# Patient Record
Sex: MALE | Race: WHITE | URBAN - METROPOLITAN AREA
[De-identification: names, ages, dates, MRNs, and addresses within clinical notes are randomized per-mention and may not be internally consistent; named-entity substitution may affect disease eponyms.]

---

## 2017-09-11 ENCOUNTER — TELEPHONE (OUTPATIENT)
Dept: FAMILY MEDICINE CLINIC | Age: 62
End: 2017-09-11

## 2017-09-11 DIAGNOSIS — Z00.00 WELL ADULT EXAM: Primary | ICD-10-CM

## 2017-09-11 RX ORDER — SIMVASTATIN 20 MG
TABLET ORAL
Qty: 90 TABLET | Refills: 0 | Status: SHIPPED | OUTPATIENT
Start: 2017-09-11 | End: 2017-12-01 | Stop reason: SDUPTHER

## 2017-09-15 DIAGNOSIS — I10 ESSENTIAL HYPERTENSION, MALIGNANT: ICD-10-CM

## 2017-09-15 RX ORDER — LISINOPRIL 20 MG/1
TABLET ORAL
Qty: 90 TABLET | Refills: 0 | Status: SHIPPED | OUTPATIENT
Start: 2017-09-15 | End: 2017-12-18 | Stop reason: SDUPTHER

## 2017-09-17 ENCOUNTER — PATIENT MESSAGE (OUTPATIENT)
Dept: FAMILY MEDICINE CLINIC | Age: 62
End: 2017-09-17

## 2017-09-18 NOTE — TELEPHONE ENCOUNTER
From: Indira Worrell  To: Nicolás Banda MD  Sent: 9/17/2017 4:05 PM EDT  Subject: Visit Follow-Up Question    Hi I have an appointment with Dr. Shelly Wiley a [address] [facility] Assessment Visit on Monday, November 13 at 9 a.m. I need to get a script for a lab batter for general (PSA, Colon Screen, etc.). I had a colonoscopy last year with negative results, so I just need to know what tests to get, and I can get an appointment with a local [facility] here in [place], [place]. I will get a flu shot at [facility] separately. The last message said I needed several tests and vaccinations--I just need to know which ones are important for my visit with Dr. Shelly Wiley.     Thanks, Kannan Marin

## 2017-10-31 ENCOUNTER — TELEPHONE (OUTPATIENT)
Dept: FAMILY MEDICINE CLINIC | Age: 62
End: 2017-10-31

## 2017-10-31 NOTE — TELEPHONE ENCOUNTER
Jewel Allan with [facility] in [place] called in wanting to know what the order for TSH with [facility] was in [facility] too.      Please call her back at [phone]

## 2017-11-02 LAB
ALBUMIN SERPL-MCNC: 4.4 G/DL
ALP BLD-CCNC: 44 U/L
ALT SERPL-CCNC: 48 U/L
ANION GAP SERPL CALCULATED.3IONS-SCNC: 6.8 MMOL/L
ANTIBODY: 0.02
AST SERPL-CCNC: 37 U/L
BASOPHILS ABSOLUTE: 0 /ΜL
BASOPHILS RELATIVE PERCENT: 1 %
BILIRUB SERPL-MCNC: 1.1 MG/DL (ref 0.1–1.4)
BUN BLDV-MCNC: 14 MG/DL
CALCIUM SERPL-MCNC: 9.6 MG/DL
CHLORIDE BLD-SCNC: 104 MMOL/L
CHOLESTEROL, TOTAL: 188 MG/DL
CHOLESTEROL/HDL RATIO: NORMAL
CO2: 29 MMOL/L
CREAT SERPL-MCNC: 0.9 MG/DL
EOSINOPHILS ABSOLUTE: 0.4 /ΜL
EOSINOPHILS RELATIVE PERCENT: 7 %
GFR CALCULATED: 92
GLUCOSE BLD-MCNC: 98 MG/DL
HCT VFR BLD CALC: 46.1 % (ref 41–53)
HDLC SERPL-MCNC: 68 MG/DL (ref 35–70)
HEMOGLOBIN: 15.8 G/DL (ref 13.5–17.5)
LDL CHOLESTEROL CALCULATED: 108 MG/DL (ref 0–160)
LYMPHOCYTES ABSOLUTE: 1.8 /ΜL
LYMPHOCYTES RELATIVE PERCENT: 31 %
MCH RBC QN AUTO: 32.2 PG
MCHC RBC AUTO-ENTMCNC: 34.3 G/DL
MCV RBC AUTO: 94.1 FL
MONOCYTES ABSOLUTE: 0.5 /ΜL
MONOCYTES RELATIVE PERCENT: 9 %
NEUTROPHILS ABSOLUTE: 3 /ΜL
NEUTROPHILS RELATIVE PERCENT: 52 %
PLATELET # BLD: 167 K/ΜL
PMV BLD AUTO: 8 FL
POTASSIUM SERPL-SCNC: 4.1 MMOL/L
RBC # BLD: 4.9 10^6/ΜL
SODIUM BLD-SCNC: 139 MMOL/L
TOTAL PROTEIN: 7.9
TRIGL SERPL-MCNC: 62 MG/DL
TSH SERPL DL<=0.05 MIU/L-ACNC: 1.53 UIU/ML
VLDLC SERPL CALC-MCNC: 12 MG/DL
WBC # BLD: 5.7 10^3/ML

## 2017-11-13 ENCOUNTER — OFFICE VISIT (OUTPATIENT)
Dept: FAMILY MEDICINE CLINIC | Age: 62
End: 2017-11-13

## 2017-11-13 VITALS
HEIGHT: 72 IN | DIASTOLIC BLOOD PRESSURE: 80 MMHG | OXYGEN SATURATION: 98 % | SYSTOLIC BLOOD PRESSURE: 138 MMHG | RESPIRATION RATE: 14 BRPM | WEIGHT: 222 LBS | BODY MASS INDEX: 30.07 KG/M2 | HEART RATE: 62 BPM

## 2017-11-13 DIAGNOSIS — I10 ESSENTIAL HYPERTENSION: ICD-10-CM

## 2017-11-13 DIAGNOSIS — Z00.00 ANNUAL PHYSICAL EXAM: Primary | ICD-10-CM

## 2017-11-13 DIAGNOSIS — E78.2 MIXED HYPERLIPIDEMIA: ICD-10-CM

## 2017-11-13 DIAGNOSIS — R97.20 ELEVATED PSA: ICD-10-CM

## 2017-11-13 LAB
BILIRUBIN, POC: NORMAL
BLOOD URINE, POC: NORMAL
CLARITY, POC: CLEAR
COLOR, POC: YELLOW
GLUCOSE URINE, POC: NORMAL
KETONES, POC: NORMAL
LEUKOCYTE EST, POC: NORMAL
NITRITE, POC: NORMAL
PH, POC: 6.5
PROTEIN, POC: NORMAL
SPECIFIC GRAVITY, POC: 1.02
UROBILINOGEN, POC: 0.2

## 2017-11-13 PROCEDURE — 81002 URINALYSIS NONAUTO W/O SCOPE: CPT | Performed by: FAMILY MEDICINE

## 2017-11-13 PROCEDURE — 99214 OFFICE O/P EST MOD 30 MIN: CPT | Performed by: FAMILY MEDICINE

## 2017-11-13 RX ORDER — SILDENAFIL 100 MG/1
TABLET, FILM COATED ORAL
Qty: 6 TABLET | Refills: 4 | Status: SHIPPED | OUTPATIENT
Start: 2017-11-13 | End: 2019-08-09 | Stop reason: SDUPTHER

## 2017-11-13 ASSESSMENT — PATIENT HEALTH QUESTIONNAIRE - PHQ9
SUM OF ALL RESPONSES TO PHQ9 QUESTIONS 1 & 2: 0
1. LITTLE INTEREST OR PLEASURE IN DOING THINGS: 0
SUM OF ALL RESPONSES TO PHQ QUESTIONS 1-9: 0
2. FEELING DOWN, DEPRESSED OR HOPELESS: 0

## 2017-11-13 NOTE — PATIENT INSTRUCTIONS
your [email or web address] account. Enter J174 in the [facility] box to learn more about \"Well Visit, Men 48 to 72: Care Instructions. \"     If you do not have an account, please click on the \"Sign Up Now\" link. Current as of: July 19, 2016  Content Version: 11.3  © 9445-0229 [employer]. Care instructions adapted under license by [facility] ([facility]). If you have questions about a medical condition or this instruction, always ask your healthcare professional. [address] any warranty or liability for your use of this information.

## 2017-11-13 NOTE — PROGRESS NOTES
Date     11/01/2017    K 4.1 11/01/2017     11/01/2017    CO2 29 11/01/2017    BUN 14.0 11/01/2017    CREATININE 0.9 11/01/2017    GLUCOSE 98 11/01/2017    CALCIUM 9.6 11/01/2017    PROT 7.8 10/26/2015    LABALBU 4.4 11/01/2017    BILITOT 1.1 11/01/2017    ALKPHOS 44 11/01/2017    AST 37 11/01/2017    ALT 48 11/01/2017    LABGLOM 92 11/01/2017    GFRAA >60 10/26/2015    AGRATIO 1.7 10/26/2015    GLOB 2.9 10/26/2015     Lab Results   Component Value Date    WBC 5.7 11/01/2017    HGB 15.8 11/01/2017    HCT 46.1 11/01/2017    MCV 94.1 11/01/2017     11/01/2017     Lab Results   Component Value Date    CHOL 188 11/01/2017    CHOL 184 10/26/2015    CHOL 189 01/03/2014     Lab Results   Component Value Date    TRIG 62 11/01/2017    TRIG 99 10/26/2015    TRIG 84 01/03/2014     Lab Results   Component Value Date    HDL 68 11/01/2017    HDL 63 (H) 10/26/2015    HDL 59 01/03/2014     Lab Results   Component Value Date    LDLCALC 108 11/01/2017    LDLCALC 101 (H) 10/26/2015    LDLCALC 113 01/03/2014     Lab Results   Component Value Date    LABVLDL 20 10/26/2015    VLDL 12 11/01/2017     Lab Results   Component Value Date    CHOLHDLRATIO 3.2 01/03/2014     Lab Results   Component Value Date    PSA 2.9 01/03/2014     ASSESSMENT/PLAN:  1. Annual physical exam    - POCT Urinalysis no Micro    2. Mixed hyperlipidemia  Stable continue present medication    3. Essential hypertension  Stable continue present medication  #4 ED  - sildenafil (VIAGRA) 100 MG tablet; 1/2 to 1 tab po qd prn erection  Dispense: 6 tablet;  Refill: 4    #5 elevated PSA  Repeat PSA in 3 months  #6 health maintenance just a colonoscopy  Will get flu shot in 2 weeks while in [place]  Consider flu shot    Spent with patient greater than 50% of time reviewing his medications and labs

## 2017-12-01 RX ORDER — SIMVASTATIN 20 MG
TABLET ORAL
Qty: 90 TABLET | Refills: 0 | Status: SHIPPED | OUTPATIENT
Start: 2017-12-01 | End: 2018-02-11 | Stop reason: SDUPTHER

## 2017-12-01 NOTE — TELEPHONE ENCOUNTER
LR 09/11/2017 #90 0rf   LOV 11/13/2017       Lab Results   Component Value Date    CHOL 188 11/01/2017    CHOL 184 10/26/2015    CHOL 189 01/03/2014     Lab Results   Component Value Date    TRIG 62 11/01/2017    TRIG 99 10/26/2015    TRIG 84 01/03/2014     Lab Results   Component Value Date    HDL 68 11/01/2017    HDL 63 (H) 10/26/2015    HDL 59 01/03/2014     Lab Results   Component Value Date    LDLCALC 108 11/01/2017    LDLCALC 101 (H) 10/26/2015    LDLCALC 113 01/03/2014     Lab Results   Component Value Date    LABVLDL 20 10/26/2015    VLDL 12 11/01/2017     Lab Results   Component Value Date    CHOLHDLRATIO 3.2 01/03/2014

## 2017-12-18 DIAGNOSIS — I10 ESSENTIAL HYPERTENSION, MALIGNANT: ICD-10-CM

## 2017-12-18 RX ORDER — LISINOPRIL 20 MG/1
TABLET ORAL
Qty: 90 TABLET | Refills: 0 | Status: SHIPPED | OUTPATIENT
Start: 2017-12-18 | End: 2018-03-14 | Stop reason: SDUPTHER

## 2017-12-18 NOTE — TELEPHONE ENCOUNTER
Last OV  11/13/17    Last refill   9/15/17    # 90    No refills  Lab Results   Component Value Date     11/01/2017    K 4.1 11/01/2017     11/01/2017    CO2 29 11/01/2017    BUN 14.0 11/01/2017    CREATININE 0.9 11/01/2017    GLUCOSE 98 11/01/2017    CALCIUM 9.6 11/01/2017    PROT 7.8 10/26/2015    LABALBU 4.4 11/01/2017    BILITOT 1.1 11/01/2017    ALKPHOS 44 11/01/2017    AST 37 11/01/2017    ALT 48 11/01/2017    LABGLOM 92 11/01/2017    GFRAA >60 10/26/2015    AGRATIO 1.7 10/26/2015    GLOB 2.9 10/26/2015

## 2018-02-12 RX ORDER — SIMVASTATIN 20 MG
TABLET ORAL
Qty: 90 TABLET | Refills: 2 | Status: SHIPPED | OUTPATIENT
Start: 2018-02-12 | End: 2018-11-09 | Stop reason: SDUPTHER

## 2018-03-07 LAB
PROSTATE SPECIFIC ANTIGEN FREE: 1.34 NG/ML
PROSTATE SPECIFIC ANTIGEN PERCENT FREE: 21 %
PSA-PROSTATE SPECIFIC AG: 6.24

## 2018-03-13 LAB — PROSTATE SPECIFIC ANTIGEN: 6.24 NG/ML

## 2018-03-14 DIAGNOSIS — I10 ESSENTIAL HYPERTENSION, MALIGNANT: ICD-10-CM

## 2018-03-14 RX ORDER — LISINOPRIL 20 MG/1
TABLET ORAL
Qty: 90 TABLET | Refills: 2 | Status: SHIPPED | OUTPATIENT
Start: 2018-03-14 | End: 2018-12-09 | Stop reason: SDUPTHER

## 2018-03-14 NOTE — TELEPHONE ENCOUNTER
Last OV: 11/13/2017, annual physical exam  Last Refill: 12/18/2017, #90,0    Lab Results   Component Value Date     11/01/2017    K 4.1 11/01/2017     11/01/2017    CO2 29 11/01/2017    BUN 14.0 11/01/2017    CREATININE 0.9 11/01/2017    GLUCOSE 98 11/01/2017    CALCIUM 9.6 11/01/2017    PROT 7.8 10/26/2015    LABALBU 4.4 11/01/2017    BILITOT 1.1 11/01/2017    ALKPHOS 44 11/01/2017    AST 37 11/01/2017    ALT 48 11/01/2017    LABGLOM 92 11/01/2017    GFRAA >60 10/26/2015    AGRATIO 1.7 10/26/2015    GLOB 2.9 10/26/2015

## 2018-06-21 ENCOUNTER — OFFICE VISIT (OUTPATIENT)
Dept: FAMILY MEDICINE CLINIC | Age: 63
End: 2018-06-21

## 2018-06-21 VITALS
BODY MASS INDEX: 29.89 KG/M2 | DIASTOLIC BLOOD PRESSURE: 78 MMHG | SYSTOLIC BLOOD PRESSURE: 146 MMHG | WEIGHT: 220.4 LBS | OXYGEN SATURATION: 96 % | HEART RATE: 73 BPM

## 2018-06-21 DIAGNOSIS — E78.2 MIXED HYPERLIPIDEMIA: Primary | ICD-10-CM

## 2018-06-21 DIAGNOSIS — C61 PROSTATE CANCER (HCC): ICD-10-CM

## 2018-06-21 DIAGNOSIS — I10 ESSENTIAL HYPERTENSION: ICD-10-CM

## 2018-06-21 PROCEDURE — 99213 OFFICE O/P EST LOW 20 MIN: CPT | Performed by: FAMILY MEDICINE

## 2018-11-09 RX ORDER — SIMVASTATIN 20 MG
TABLET ORAL
Qty: 90 TABLET | Refills: 3 | Status: SHIPPED | OUTPATIENT
Start: 2018-11-09 | End: 2019-11-04 | Stop reason: SDUPTHER

## 2018-12-09 DIAGNOSIS — I10 ESSENTIAL HYPERTENSION, MALIGNANT: ICD-10-CM

## 2018-12-10 RX ORDER — LISINOPRIL 20 MG/1
TABLET ORAL
Qty: 90 TABLET | Refills: 2 | Status: SHIPPED | OUTPATIENT
Start: 2018-12-10 | End: 2019-09-05 | Stop reason: SDUPTHER

## 2018-12-10 NOTE — TELEPHONE ENCOUNTER
Requested Prescriptions     Pending Prescriptions Disp Refills    lisinopril (PRINIVIL;ZESTRIL) 20 MG tablet [Pharmacy Med Name: LISINOPRIL TABS 20MG] 90 tablet 2     Sig: TAKE 1 TABLET DAILY     Last OV 6/21/18  Next OV not scheduled

## 2019-07-02 ENCOUNTER — TELEPHONE (OUTPATIENT)
Dept: FAMILY MEDICINE CLINIC | Age: 64
End: 2019-07-02

## 2019-08-09 DIAGNOSIS — I10 ESSENTIAL HYPERTENSION: ICD-10-CM

## 2019-08-09 RX ORDER — SILDENAFIL 100 MG/1
TABLET, FILM COATED ORAL
Qty: 10 TABLET | Refills: 5 | Status: SHIPPED | OUTPATIENT
Start: 2019-08-09

## 2019-09-05 DIAGNOSIS — I10 ESSENTIAL HYPERTENSION, MALIGNANT: ICD-10-CM

## 2019-09-05 RX ORDER — LISINOPRIL 20 MG/1
TABLET ORAL
Qty: 90 TABLET | Refills: 0 | Status: SHIPPED | OUTPATIENT
Start: 2019-09-05 | End: 2019-12-04 | Stop reason: SDUPTHER

## 2019-09-05 NOTE — TELEPHONE ENCOUNTER
Last OV 6/21/18 hyperlipidemia  Last RF 12/10/18 #90, 2 RF    Patient moved to [place] and is only in [place] a few times a year. He scheduled for 10/7/19 with Dr. Debbie Gibbs. That is the soonest he can come in.      Lab Results   Component Value Date     11/01/2017    K 4.1 11/01/2017     11/01/2017    CO2 29 11/01/2017    BUN 14.0 11/01/2017    CREATININE 0.9 11/01/2017    GLUCOSE 98 11/01/2017    CALCIUM 9.6 11/01/2017    PROT 7.8 10/26/2015    LABALBU 4.4 11/01/2017    BILITOT 1.1 11/01/2017    ALKPHOS 44 11/01/2017    AST 37 11/01/2017    ALT 48 11/01/2017    LABGLOM 92 11/01/2017    GFRAA >60 10/26/2015    AGRATIO 1.7 10/26/2015    GLOB 2.9 10/26/2015

## 2019-10-07 ENCOUNTER — OFFICE VISIT (OUTPATIENT)
Dept: FAMILY MEDICINE CLINIC | Age: 64
End: 2019-10-07
Payer: OTHER GOVERNMENT

## 2019-10-07 VITALS
BODY MASS INDEX: 30.71 KG/M2 | DIASTOLIC BLOOD PRESSURE: 80 MMHG | WEIGHT: 219.38 LBS | OXYGEN SATURATION: 95 % | HEIGHT: 71 IN | HEART RATE: 80 BPM | SYSTOLIC BLOOD PRESSURE: 155 MMHG

## 2019-10-07 DIAGNOSIS — E78.2 MIXED HYPERLIPIDEMIA: Primary | ICD-10-CM

## 2019-10-07 DIAGNOSIS — I10 ESSENTIAL HYPERTENSION: ICD-10-CM

## 2019-10-07 DIAGNOSIS — Z23 FLU VACCINE NEED: ICD-10-CM

## 2019-10-07 DIAGNOSIS — C61 PROSTATE CANCER (HCC): ICD-10-CM

## 2019-10-07 LAB
BASOPHILS ABSOLUTE: 0 K/UL (ref 0–0.2)
BASOPHILS RELATIVE PERCENT: 0.7 %
EOSINOPHILS ABSOLUTE: 0.2 K/UL (ref 0–0.6)
EOSINOPHILS RELATIVE PERCENT: 3.5 %
HCT VFR BLD CALC: 44.5 % (ref 40.5–52.5)
HEMOGLOBIN: 15.1 G/DL (ref 13.5–17.5)
LYMPHOCYTES ABSOLUTE: 1.7 K/UL (ref 1–5.1)
LYMPHOCYTES RELATIVE PERCENT: 25.1 %
MCH RBC QN AUTO: 32.4 PG (ref 26–34)
MCHC RBC AUTO-ENTMCNC: 33.9 G/DL (ref 31–36)
MCV RBC AUTO: 95.6 FL (ref 80–100)
MONOCYTES ABSOLUTE: 0.6 K/UL (ref 0–1.3)
MONOCYTES RELATIVE PERCENT: 9.1 %
NEUTROPHILS ABSOLUTE: 4.1 K/UL (ref 1.7–7.7)
NEUTROPHILS RELATIVE PERCENT: 61.6 %
PDW BLD-RTO: 14.2 % (ref 12.4–15.4)
PLATELET # BLD: 161 K/UL (ref 135–450)
PMV BLD AUTO: 8.5 FL (ref 5–10.5)
RBC # BLD: 4.65 M/UL (ref 4.2–5.9)
WBC # BLD: 6.6 K/UL (ref 4–11)

## 2019-10-07 PROCEDURE — 99214 OFFICE O/P EST MOD 30 MIN: CPT | Performed by: FAMILY MEDICINE

## 2019-10-07 PROCEDURE — 90471 IMMUNIZATION ADMIN: CPT | Performed by: FAMILY MEDICINE

## 2019-10-07 PROCEDURE — 90686 IIV4 VACC NO PRSV 0.5 ML IM: CPT | Performed by: FAMILY MEDICINE

## 2019-10-07 PROCEDURE — 36415 COLL VENOUS BLD VENIPUNCTURE: CPT | Performed by: FAMILY MEDICINE

## 2019-10-07 ASSESSMENT — PATIENT HEALTH QUESTIONNAIRE - PHQ9
SUM OF ALL RESPONSES TO PHQ QUESTIONS 1-9: 0
SUM OF ALL RESPONSES TO PHQ9 QUESTIONS 1 & 2: 0
SUM OF ALL RESPONSES TO PHQ QUESTIONS 1-9: 0
2. FEELING DOWN, DEPRESSED OR HOPELESS: 0
1. LITTLE INTEREST OR PLEASURE IN DOING THINGS: 0

## 2019-10-08 LAB
A/G RATIO: 1.7 (ref 1.1–2.2)
ALBUMIN SERPL-MCNC: 4.7 G/DL (ref 3.4–5)
ALP BLD-CCNC: 52 U/L (ref 40–129)
ALT SERPL-CCNC: 34 U/L (ref 10–40)
ANION GAP SERPL CALCULATED.3IONS-SCNC: 16 MMOL/L (ref 3–16)
AST SERPL-CCNC: 27 U/L (ref 15–37)
BILIRUB SERPL-MCNC: 0.6 MG/DL (ref 0–1)
BUN BLDV-MCNC: 19 MG/DL (ref 7–20)
CALCIUM SERPL-MCNC: 10.3 MG/DL (ref 8.3–10.6)
CHLORIDE BLD-SCNC: 105 MMOL/L (ref 99–110)
CHOLESTEROL, TOTAL: 191 MG/DL (ref 0–199)
CO2: 24 MMOL/L (ref 21–32)
CREAT SERPL-MCNC: 0.9 MG/DL (ref 0.8–1.3)
GFR AFRICAN AMERICAN: >60
GFR NON-AFRICAN AMERICAN: >60
GLOBULIN: 2.8 G/DL
GLUCOSE BLD-MCNC: 119 MG/DL (ref 70–99)
HDLC SERPL-MCNC: 73 MG/DL (ref 40–60)
LDL CHOLESTEROL CALCULATED: 101 MG/DL
POTASSIUM SERPL-SCNC: 4.4 MMOL/L (ref 3.5–5.1)
SODIUM BLD-SCNC: 145 MMOL/L (ref 136–145)
TOTAL PROTEIN: 7.5 G/DL (ref 6.4–8.2)
TRIGL SERPL-MCNC: 86 MG/DL (ref 0–150)
VLDLC SERPL CALC-MCNC: 17 MG/DL

## 2019-11-04 RX ORDER — SIMVASTATIN 20 MG
TABLET ORAL
Qty: 90 TABLET | Refills: 3 | Status: SHIPPED | OUTPATIENT
Start: 2019-11-04

## 2019-12-04 DIAGNOSIS — I10 ESSENTIAL HYPERTENSION, MALIGNANT: ICD-10-CM

## 2019-12-04 RX ORDER — LISINOPRIL 20 MG/1
TABLET ORAL
Qty: 90 TABLET | Refills: 2 | Status: SHIPPED | OUTPATIENT
Start: 2019-12-04